# Patient Record
Sex: MALE | Race: WHITE | Employment: FULL TIME | URBAN - METROPOLITAN AREA
[De-identification: names, ages, dates, MRNs, and addresses within clinical notes are randomized per-mention and may not be internally consistent; named-entity substitution may affect disease eponyms.]

---

## 2017-12-17 ENCOUNTER — APPOINTMENT (OUTPATIENT)
Dept: CT IMAGING | Facility: HOSPITAL | Age: 47
End: 2017-12-17
Attending: EMERGENCY MEDICINE
Payer: COMMERCIAL

## 2017-12-17 ENCOUNTER — HOSPITAL ENCOUNTER (EMERGENCY)
Facility: HOSPITAL | Age: 47
Discharge: HOME | End: 2017-12-17
Attending: EMERGENCY MEDICINE
Payer: COMMERCIAL

## 2017-12-17 ENCOUNTER — HOSPITAL ENCOUNTER (OUTPATIENT)
Age: 47
Discharge: ANOTHER HOSPITAL | End: 2017-12-17
Payer: COMMERCIAL

## 2017-12-17 VITALS
DIASTOLIC BLOOD PRESSURE: 100 MMHG | SYSTOLIC BLOOD PRESSURE: 130 MMHG | RESPIRATION RATE: 16 BRPM | WEIGHT: 230 LBS | HEART RATE: 79 BPM | OXYGEN SATURATION: 98 % | TEMPERATURE: 98 F

## 2017-12-17 VITALS
BODY MASS INDEX: 31.15 KG/M2 | HEIGHT: 72 IN | DIASTOLIC BLOOD PRESSURE: 89 MMHG | RESPIRATION RATE: 16 BRPM | OXYGEN SATURATION: 95 % | SYSTOLIC BLOOD PRESSURE: 135 MMHG | TEMPERATURE: 97 F | HEART RATE: 72 BPM | WEIGHT: 230 LBS

## 2017-12-17 DIAGNOSIS — R31.9 HEMATURIA, UNSPECIFIED TYPE: ICD-10-CM

## 2017-12-17 DIAGNOSIS — N20.1 RIGHT URETERAL STONE: ICD-10-CM

## 2017-12-17 DIAGNOSIS — R10.9 FLANK PAIN: Primary | ICD-10-CM

## 2017-12-17 DIAGNOSIS — N23 RENAL COLIC: Primary | ICD-10-CM

## 2017-12-17 PROCEDURE — 96374 THER/PROPH/DIAG INJ IV PUSH: CPT

## 2017-12-17 PROCEDURE — 81001 URINALYSIS AUTO W/SCOPE: CPT | Performed by: EMERGENCY MEDICINE

## 2017-12-17 PROCEDURE — 99202 OFFICE O/P NEW SF 15 MIN: CPT

## 2017-12-17 PROCEDURE — 80048 BASIC METABOLIC PNL TOTAL CA: CPT | Performed by: EMERGENCY MEDICINE

## 2017-12-17 PROCEDURE — 96361 HYDRATE IV INFUSION ADD-ON: CPT

## 2017-12-17 PROCEDURE — 74176 CT ABD & PELVIS W/O CONTRAST: CPT | Performed by: EMERGENCY MEDICINE

## 2017-12-17 PROCEDURE — 99203 OFFICE O/P NEW LOW 30 MIN: CPT

## 2017-12-17 PROCEDURE — 99284 EMERGENCY DEPT VISIT MOD MDM: CPT

## 2017-12-17 PROCEDURE — 85025 COMPLETE CBC W/AUTO DIFF WBC: CPT | Performed by: EMERGENCY MEDICINE

## 2017-12-17 PROCEDURE — 81002 URINALYSIS NONAUTO W/O SCOPE: CPT

## 2017-12-17 RX ORDER — HYDROCODONE BITARTRATE AND ACETAMINOPHEN 5; 325 MG/1; MG/1
1-2 TABLET ORAL EVERY 4 HOURS PRN
Qty: 12 TABLET | Refills: 0 | Status: SHIPPED | OUTPATIENT
Start: 2017-12-17 | End: 2017-12-20

## 2017-12-17 RX ORDER — KETOROLAC TROMETHAMINE 30 MG/ML
30 INJECTION, SOLUTION INTRAMUSCULAR; INTRAVENOUS ONCE
Status: COMPLETED | OUTPATIENT
Start: 2017-12-17 | End: 2017-12-17

## 2017-12-17 RX ORDER — TAMSULOSIN HYDROCHLORIDE 0.4 MG/1
0.4 CAPSULE ORAL DAILY
Qty: 7 CAPSULE | Refills: 0 | Status: SHIPPED | OUTPATIENT
Start: 2017-12-17 | End: 2017-12-24

## 2017-12-17 NOTE — ED NOTES
PT safe to DC home per MD. Marilou Richards to dress self. DC teaching done, pt verbalizes understanding. Ambulatory with steady gait to exit.

## 2017-12-17 NOTE — ED INITIAL ASSESSMENT (HPI)
Right flank pain since yesterday. Sent from [address] [place] [address] to r/o kidney stone.

## 2017-12-17 NOTE — ED INITIAL ASSESSMENT (HPI)
C/o urinary frequency. Also c/o RT lumbar pain radiating to hir RT quadrant area.  DEneis burning or pain in urination started last night

## 2017-12-17 NOTE — ED PROVIDER NOTES
Patient presents with:  Abdomen/Flank Pain (GI/)  Urinary Symptoms (urologic)      HPI:     Yajaira Shah is a 52year old male who presents for evaluation of a chief complaint of R sided flank pain that radiates into RLQ.  Pt reports symptoms initial and RLQ tenderness on examination. . Pt appears uncomfortable, will send to the ER for higher level of care.        Orders Placed This Encounter      POC Urinalysis Dipstick Once    Labs performed this visit:    Recent Results (from the past 10 hour(s))  -E

## 2017-12-17 NOTE — ED PROVIDER NOTES
Patient Seen in: [facility] Emergency Department    History   Patient presents with:  Abdomen/Flank Pain (GI/)    Stated Complaint: flank pain    HPI    Patient is a 51-year-old male who states that yesterday he had some very mild right-sided fl normal and breath sounds normal. No respiratory distress. Abdominal: Soft. Bowel sounds are normal. Exhibits no distension and no mass. There is no tenderness. There is no rebound and no guarding. Musculoskeletal: Normal range of motion.  Exhibits no ed Oral)(cpt=74176)    Result Date: 12/17/2017  CONCLUSION: 4 mm calculus distal right ureter with moderate right hydroureteronephrosis. Chronic left UPJ obstruction. Splenomegaly of unknown etiology. Findings discussed with patient.   He would like

## 2017-12-20 ENCOUNTER — TELEPHONE (OUTPATIENT)
Dept: SURGERY | Facility: CLINIC | Age: 47
End: 2017-12-20

## 2017-12-26 NOTE — TELEPHONE ENCOUNTER
Called and offered patient ER follow up appointment for right kidney stone with [facility] for 12/28/17 at 11:20. Patient accepted appointment. Informed patient to arrive 15 minutes prior to fill out paperwork.

## 2017-12-28 ENCOUNTER — APPOINTMENT (OUTPATIENT)
Dept: LAB | Facility: HOSPITAL | Age: 47
End: 2017-12-28
Attending: UROLOGY
Payer: COMMERCIAL

## 2017-12-28 ENCOUNTER — OFFICE VISIT (OUTPATIENT)
Dept: SURGERY | Facility: CLINIC | Age: 47
End: 2017-12-28

## 2017-12-28 VITALS
TEMPERATURE: 98 F | DIASTOLIC BLOOD PRESSURE: 79 MMHG | SYSTOLIC BLOOD PRESSURE: 137 MMHG | RESPIRATION RATE: 16 BRPM | HEART RATE: 62 BPM | HEIGHT: 72 IN | BODY MASS INDEX: 31.15 KG/M2 | WEIGHT: 230 LBS

## 2017-12-28 DIAGNOSIS — N20.1 URETERAL CALCULI: ICD-10-CM

## 2017-12-28 DIAGNOSIS — N20.0 KIDNEY STONES: ICD-10-CM

## 2017-12-28 DIAGNOSIS — Z12.5 SCREENING PSA (PROSTATE SPECIFIC ANTIGEN): ICD-10-CM

## 2017-12-28 DIAGNOSIS — N20.0 KIDNEY STONES: Primary | ICD-10-CM

## 2017-12-28 LAB
BILIRUB UR QL: NEGATIVE
CLARITY UR: CLEAR
COLOR UR: YELLOW
GLUCOSE UR-MCNC: NEGATIVE MG/DL
HGB UR QL STRIP.AUTO: NEGATIVE
KETONES UR-MCNC: NEGATIVE MG/DL
LEUKOCYTE ESTERASE UR QL STRIP.AUTO: NEGATIVE
NITRITE UR QL STRIP.AUTO: NEGATIVE
PH UR: 5 [PH] (ref 5–8)
PROT UR-MCNC: NEGATIVE MG/DL
PSA SERPL-MCNC: 0.2 NG/ML (ref 0–4)
SP GR UR STRIP: 1.02 (ref 1–1.03)
URATE SERPL-MCNC: 6.3 MG/DL (ref 3.3–8.7)
UROBILINOGEN UR STRIP-ACNC: <2
VIT C UR-MCNC: NEGATIVE MG/DL

## 2017-12-28 PROCEDURE — 81003 URINALYSIS AUTO W/O SCOPE: CPT

## 2017-12-28 PROCEDURE — 36415 COLL VENOUS BLD VENIPUNCTURE: CPT

## 2017-12-28 PROCEDURE — 84550 ASSAY OF BLOOD/URIC ACID: CPT

## 2017-12-28 PROCEDURE — 99204 OFFICE O/P NEW MOD 45 MIN: CPT | Performed by: UROLOGY

## 2017-12-28 PROCEDURE — 99212 OFFICE O/P EST SF 10 MIN: CPT | Performed by: UROLOGY

## 2017-12-28 NOTE — PROGRESS NOTES
[facility] Patient Status:  Outpatient    10/16/1970 MRN [id number]   Location [address] [place] [address] Attending Derik De La Garza. [address] Day # 0 PCP No primary care provider on file.        Berhane Lima for kidney or bladder stones prior to above. No kidney infections with high fever or flank pain. No history of bladder, prostate, testicular, epididymal infections. No history of trauma or injury to the urinary tract or genitalia.  No history of tumors pain.  8.   There is no history of skin disease. 9.   No history of neurological disease in the form of stroke, multiple sclerosis, seizures or depression. 10. No history of endocrine disorders in the form of diabetes or thyroid disease. 11.  No histor Glans and meatus are normal.  Penile shaft skin is normal.  There are no masses or plaques. Scrotum is fully developed. Testicles are descended bilaterally and normal in size, position and consistency. Epididymis, vas and cord structures are intact.   Valentine Segovia face-to-face discussion of further evaluation and therapy. Eric Moreau MD    .........................................12/28/2017

## 2018-01-08 ENCOUNTER — HOSPITAL ENCOUNTER (OUTPATIENT)
Dept: GENERAL RADIOLOGY | Facility: HOSPITAL | Age: 48
Discharge: HOME | End: 2018-01-08
Attending: UROLOGY
Payer: COMMERCIAL

## 2018-01-08 DIAGNOSIS — N20.1 URETERAL CALCULI: ICD-10-CM

## 2018-01-08 LAB — CREAT BLD-MCNC: 0.9 MG/DL (ref 0.5–1.5)

## 2018-01-08 PROCEDURE — 82565 ASSAY OF CREATININE: CPT

## 2018-01-08 PROCEDURE — 74415 UROGRAPHY NFS DRIP&/BLS W/NF: CPT | Performed by: UROLOGY

## 2018-01-30 ENCOUNTER — OFFICE VISIT (OUTPATIENT)
Dept: SURGERY | Facility: CLINIC | Age: 48
End: 2018-01-30

## 2018-01-30 VITALS
BODY MASS INDEX: 31.15 KG/M2 | TEMPERATURE: 98 F | RESPIRATION RATE: 18 BRPM | WEIGHT: 230 LBS | HEART RATE: 74 BPM | HEIGHT: 72 IN | DIASTOLIC BLOOD PRESSURE: 92 MMHG | SYSTOLIC BLOOD PRESSURE: 143 MMHG

## 2018-01-30 DIAGNOSIS — N20.1 URETERAL CALCULI: Primary | ICD-10-CM

## 2018-01-30 PROCEDURE — 99212 OFFICE O/P EST SF 10 MIN: CPT | Performed by: UROLOGY

## 2018-01-30 PROCEDURE — 99214 OFFICE O/P EST MOD 30 MIN: CPT | Performed by: UROLOGY

## 2018-01-30 NOTE — PROGRESS NOTES
[facility] Patient Status:  Outpatient    10/16/1970 MRN [id number]   Location [address] [place] [address] Attending Jenelle Clemons. [address] Day # 0 PCP No primary care provider on file.        Jasmina Kelly patient's first onset of renal colic.   Patient denied previous history kidney stones but does have a family history kidney stones in his father patient awoke on December 17, 2017 right lower quadrant pain rating to the right flank no nausea vomiting howeve

## 2018-02-28 ENCOUNTER — OFFICE VISIT (OUTPATIENT)
Dept: SURGERY | Facility: CLINIC | Age: 48
End: 2018-02-28

## 2018-02-28 VITALS
HEART RATE: 67 BPM | WEIGHT: 230 LBS | DIASTOLIC BLOOD PRESSURE: 90 MMHG | BODY MASS INDEX: 31.15 KG/M2 | HEIGHT: 72 IN | TEMPERATURE: 98 F | SYSTOLIC BLOOD PRESSURE: 133 MMHG

## 2018-02-28 DIAGNOSIS — Z30.09 VASECTOMY EVALUATION: Primary | ICD-10-CM

## 2018-02-28 PROCEDURE — 99214 OFFICE O/P EST MOD 30 MIN: CPT | Performed by: UROLOGY

## 2018-02-28 PROCEDURE — 99212 OFFICE O/P EST SF 10 MIN: CPT | Performed by: UROLOGY

## 2018-02-28 RX ORDER — LOSARTAN POTASSIUM 25 MG/1
TABLET ORAL
COMMUNITY
Start: 2018-02-07

## 2018-02-28 RX ORDER — DIAZEPAM 10 MG/1
TABLET ORAL
Qty: 1 TABLET | Refills: 0 | Status: SHIPPED | OUTPATIENT
Start: 2018-02-28

## 2018-02-28 NOTE — PROGRESS NOTES
[facility] Patient Status:  Outpatient    10/16/1970 MRN [id number]   Location [facility] Attending Bri Elizondo. [address] Day # 0 PCP No primary care provider on file.        Pawel Monroy 2017 and initial consultation patient did sit for normal urinalysis and a very low normal PSA at that visit as well at 0.2        ASSESSMENT AND PLAN:  Case summary: Patient is a 15-year-old white male referred initially through the emergency room December ten days prior to procedure, he can then take Tylenol. We do discuss the preparation of eating a well-balanced meal the day of the procedure, exactly how the scrotum is shaved and prepped.   We do discuss the incisions themselves, position and what is take Prescription for Valium of 10 mg to be taken an hour before the procedure given to patient. I answered all of the patient's questions.   I spent 35 minutes with patient and well over half of the time in face to face discussion of further evaluation and th

## 2018-05-04 ENCOUNTER — TELEPHONE (OUTPATIENT)
Dept: SURGERY | Facility: CLINIC | Age: 48
End: 2018-05-04

## 2018-05-04 NOTE — TELEPHONE ENCOUNTER
Pt called stating pt has to cxl vas procedure 5-18-18. Pt will not be available for 2 to 2 and a half months. Pt will call back to rs.

## 2018-05-11 NOTE — TELEPHONE ENCOUNTER
Spoke with pt and he confirmed that he does want to cxl his 5/18 Vas and he will c/b when he is back in town to reschd.

## 2018-06-27 ENCOUNTER — APPOINTMENT (OUTPATIENT)
Dept: OCCUPATIONAL MEDICINE | Age: 48
End: 2018-06-27
Attending: EMERGENCY MEDICINE

## 2019-09-30 ENCOUNTER — APPOINTMENT (OUTPATIENT)
Dept: CT IMAGING | Facility: HOSPITAL | Age: 49
End: 2019-09-30
Attending: EMERGENCY MEDICINE
Payer: COMMERCIAL

## 2019-09-30 ENCOUNTER — HOSPITAL ENCOUNTER (EMERGENCY)
Facility: HOSPITAL | Age: 49
Discharge: HOME | End: 2019-09-30
Attending: EMERGENCY MEDICINE
Payer: COMMERCIAL

## 2019-09-30 VITALS
DIASTOLIC BLOOD PRESSURE: 70 MMHG | RESPIRATION RATE: 18 BRPM | OXYGEN SATURATION: 98 % | HEART RATE: 74 BPM | TEMPERATURE: 98 F | SYSTOLIC BLOOD PRESSURE: 120 MMHG

## 2019-09-30 DIAGNOSIS — N20.1 RIGHT URETERAL STONE: Primary | ICD-10-CM

## 2019-09-30 PROCEDURE — 99284 EMERGENCY DEPT VISIT MOD MDM: CPT

## 2019-09-30 PROCEDURE — 96361 HYDRATE IV INFUSION ADD-ON: CPT

## 2019-09-30 PROCEDURE — 80048 BASIC METABOLIC PNL TOTAL CA: CPT | Performed by: EMERGENCY MEDICINE

## 2019-09-30 PROCEDURE — 74176 CT ABD & PELVIS W/O CONTRAST: CPT | Performed by: EMERGENCY MEDICINE

## 2019-09-30 PROCEDURE — 96374 THER/PROPH/DIAG INJ IV PUSH: CPT

## 2019-09-30 PROCEDURE — 85025 COMPLETE CBC W/AUTO DIFF WBC: CPT | Performed by: EMERGENCY MEDICINE

## 2019-09-30 PROCEDURE — 81001 URINALYSIS AUTO W/SCOPE: CPT | Performed by: EMERGENCY MEDICINE

## 2019-09-30 RX ORDER — KETOROLAC TROMETHAMINE 30 MG/ML
30 INJECTION, SOLUTION INTRAMUSCULAR; INTRAVENOUS ONCE
Status: COMPLETED | OUTPATIENT
Start: 2019-09-30 | End: 2019-09-30

## 2019-09-30 RX ORDER — ONDANSETRON 4 MG/1
4 TABLET, ORALLY DISINTEGRATING ORAL EVERY 8 HOURS PRN
Qty: 20 TABLET | Refills: 0 | Status: SHIPPED | OUTPATIENT
Start: 2019-09-30

## 2019-10-01 NOTE — ED PROVIDER NOTES
Patient Seen in: [facility] Emergency Department    History   Patient presents with:  Abdomen/Flank Pain (GI/)    Stated Complaint: thinks he has kidney stone    HPI    Patient is here with complaint of possible kidney stone.   He has had one on Vital signs noted. Eye:  No scleral icterus. Eyelids appear normal, no lesions. Cardiovascular:  Normal S1 and S2, no murmur, regular, with good peripheral perfusion. Respiratory:  Lungs clear to auscultation bilaterally with good effort.   No wheezes, ---------                               -----------         ------                     CBC W/ DIFFERENTIAL[[id number]]          Abnormal            Final result                 Please view results for these tests on the individual orders.    Jordon Lozano

## 2019-10-28 PROCEDURE — 99283 EMERGENCY DEPT VISIT LOW MDM: CPT | Performed by: EMERGENCY MEDICINE

## 2021-10-18 ENCOUNTER — APPOINTMENT (OUTPATIENT)
Dept: URBAN - METROPOLITAN AREA CLINIC 321 | Age: 51
Setting detail: DERMATOLOGY
End: 2021-10-18

## 2021-10-18 DIAGNOSIS — L30.0 NUMMULAR DERMATITIS: ICD-10-CM

## 2021-10-18 DIAGNOSIS — L81.4 OTHER MELANIN HYPERPIGMENTATION: ICD-10-CM

## 2021-10-18 DIAGNOSIS — D22 MELANOCYTIC NEVI: ICD-10-CM

## 2021-10-18 PROBLEM — D22.61 MELANOCYTIC NEVI OF RIGHT UPPER LIMB, INCLUDING SHOULDER: Status: ACTIVE | Noted: 2021-10-18

## 2021-10-18 PROBLEM — D22.5 MELANOCYTIC NEVI OF TRUNK: Status: ACTIVE | Noted: 2021-10-18

## 2021-10-18 PROBLEM — D22.62 MELANOCYTIC NEVI OF LEFT UPPER LIMB, INCLUDING SHOULDER: Status: ACTIVE | Noted: 2021-10-18

## 2021-10-18 PROCEDURE — OTHER PRESCRIPTION: OTHER

## 2021-10-18 PROCEDURE — 99214 OFFICE O/P EST MOD 30 MIN: CPT

## 2021-10-18 PROCEDURE — OTHER COUNSELING: OTHER

## 2021-10-18 RX ORDER — FLUOCINONIDE 0.5 MG/G
OINTMENT TOPICAL
Qty: 60 | Refills: 1 | Status: ERX | COMMUNITY
Start: 2021-10-18

## 2021-10-18 ASSESSMENT — LOCATION SIMPLE DESCRIPTION DERM
LOCATION SIMPLE: LEFT HAND
LOCATION SIMPLE: LEFT UPPER ARM
LOCATION SIMPLE: RIGHT HAND
LOCATION SIMPLE: RIGHT UPPER ARM
LOCATION SIMPLE: CHEST

## 2021-10-18 ASSESSMENT — LOCATION DETAILED DESCRIPTION DERM
LOCATION DETAILED: LEFT ULNAR DORSAL HAND
LOCATION DETAILED: UPPER STERNUM
LOCATION DETAILED: RIGHT ANTERIOR DISTAL UPPER ARM
LOCATION DETAILED: RIGHT DORSAL MIDDLE METACARPOPHALANGEAL JOINT
LOCATION DETAILED: MIDDLE STERNUM
LOCATION DETAILED: LEFT ANTECUBITAL SKIN
LOCATION DETAILED: LEFT ANTERIOR DISTAL UPPER ARM

## 2021-10-18 ASSESSMENT — LOCATION ZONE DERM
LOCATION ZONE: TRUNK
LOCATION ZONE: ARM
LOCATION ZONE: HAND

## 2021-12-02 ENCOUNTER — TELEPHONE (OUTPATIENT)
Dept: CASE MANAGEMENT | Age: 51
End: 2021-12-02

## 2021-12-02 ENCOUNTER — HOSPITAL ENCOUNTER (OUTPATIENT)
Age: 51
Discharge: HOME | End: 2021-12-02
Payer: COMMERCIAL

## 2021-12-02 VITALS
RESPIRATION RATE: 18 BRPM | HEIGHT: 72 IN | SYSTOLIC BLOOD PRESSURE: 145 MMHG | OXYGEN SATURATION: 99 % | TEMPERATURE: 98 F | HEART RATE: 100 BPM | DIASTOLIC BLOOD PRESSURE: 87 MMHG | WEIGHT: 248 LBS | BODY MASS INDEX: 33.59 KG/M2

## 2021-12-02 DIAGNOSIS — Z20.822 ENCOUNTER FOR LABORATORY TESTING FOR COVID-19 VIRUS: ICD-10-CM

## 2021-12-02 DIAGNOSIS — J02.0 ACUTE STREPTOCOCCAL PHARYNGITIS: ICD-10-CM

## 2021-12-02 DIAGNOSIS — U07.1 COVID-19 VIRUS INFECTION: Primary | ICD-10-CM

## 2021-12-02 DIAGNOSIS — R03.0 ELEVATED BLOOD PRESSURE READING: ICD-10-CM

## 2021-12-02 DIAGNOSIS — H92.01 EARACHE ON RIGHT: ICD-10-CM

## 2021-12-02 PROCEDURE — U0002 COVID-19 LAB TEST NON-CDC: HCPCS | Performed by: PHYSICIAN ASSISTANT

## 2021-12-02 PROCEDURE — 87880 STREP A ASSAY W/OPTIC: CPT | Performed by: PHYSICIAN ASSISTANT

## 2021-12-02 PROCEDURE — 99204 OFFICE O/P NEW MOD 45 MIN: CPT | Performed by: PHYSICIAN ASSISTANT

## 2021-12-02 RX ORDER — PENICILLIN V POTASSIUM 500 MG/1
500 TABLET ORAL 2 TIMES DAILY
Qty: 20 TABLET | Refills: 0 | Status: SHIPPED | OUTPATIENT
Start: 2021-12-02 | End: 2021-12-12

## 2021-12-02 RX ORDER — ACETAMINOPHEN 325 MG/1
650 TABLET ORAL
Qty: 40 TABLET | Refills: 0 | Status: SHIPPED | OUTPATIENT
Start: 2021-12-02

## 2021-12-02 NOTE — TELEPHONE ENCOUNTER
Called patient to schedule PAB infusion  Patient states he is going back to the immediate care to have infusion done.

## 2021-12-02 NOTE — ED INITIAL ASSESSMENT (HPI)
Pt began experiencing right ear pain, chills, fatigue, and body aches yesterday and a sore throat this morning.

## 2021-12-02 NOTE — ED PROVIDER NOTES
Patient Seen in: [facility]    History   Patient presents with:  Ear Pain: Right ear  Covid-19 Test    Stated Complaint: flu?? ear inf??    HPI    49-year-old male presents with chief complaint of right earache. Onset yesterday.   Patient rep stated complaint: flu?? ear inf??  Other systems are as noted in HPI. Constitutional and vital signs reviewed. All other systems reviewed and negative except as noted above.     PSFH elements reviewed from today and agreed except as otherwise stated i obvious deformity. Neurological: Gross motor movement is intact in all 4 extremities. Patient exhibits normal speech. Skin: Skin is normal to inspection. Warm and dry. No obvious bruising. No obvious rash.       ED Course     Labs Reviewed   POCT ANALY items, clean and disinfect “high touch” surfaces, and frequent handwashing) according to CDC guidelines. The patient was informed of their elevated blood pressure reading at immediate care.   They were informed of the dangers of undiagnosed and untre

## 2021-12-05 ENCOUNTER — NURSE ONLY (OUTPATIENT)
Dept: INFUSION CENTER | Age: 51
End: 2021-12-05
Attending: PHYSICIAN ASSISTANT
Payer: COMMERCIAL

## 2021-12-05 VITALS
BODY MASS INDEX: 33.18 KG/M2 | SYSTOLIC BLOOD PRESSURE: 133 MMHG | RESPIRATION RATE: 16 BRPM | DIASTOLIC BLOOD PRESSURE: 91 MMHG | HEIGHT: 72 IN | OXYGEN SATURATION: 100 % | TEMPERATURE: 98 F | WEIGHT: 245 LBS | HEART RATE: 85 BPM

## 2021-12-05 RX ORDER — ACETAMINOPHEN 500 MG
1000 TABLET ORAL ONCE AS NEEDED
Status: ACTIVE | OUTPATIENT
Start: 2021-12-05 | End: 2021-12-05

## 2021-12-05 NOTE — PROGRESS NOTES
2743- infusion started , iv patent wnl, pt aware of plan of care . 0916-infusion complete , iv flushed per protocol, pt stable no distress noted. Pt breathing easy, denies any new complaints. 0930- pt stable breathing easy , no distress noted.

## 2021-12-17 ENCOUNTER — TELEPHONE (OUTPATIENT)
Dept: CASE MANAGEMENT | Age: 51
End: 2021-12-17

## 2021-12-17 NOTE — TELEPHONE ENCOUNTER
Called patient to follow up for home monitoring  Left message to call back.    COVID 19 positive: 12/2

## 2025-08-11 ENCOUNTER — APPOINTMENT (OUTPATIENT)
Dept: URBAN - METROPOLITAN AREA CLINIC 244 | Age: 55
Setting detail: DERMATOLOGY
End: 2025-08-12

## 2025-08-11 DIAGNOSIS — D22 MELANOCYTIC NEVI: ICD-10-CM

## 2025-08-11 DIAGNOSIS — Z80.8 FAMILY HISTORY OF MALIGNANT NEOPLASM OF OTHER ORGANS OR SYSTEMS: ICD-10-CM

## 2025-08-11 DIAGNOSIS — L81.4 OTHER MELANIN HYPERPIGMENTATION: ICD-10-CM

## 2025-08-11 DIAGNOSIS — Z12.83 ENCOUNTER FOR SCREENING FOR MALIGNANT NEOPLASM OF SKIN: ICD-10-CM

## 2025-08-11 PROBLEM — D22.61 MELANOCYTIC NEVI OF RIGHT UPPER LIMB, INCLUDING SHOULDER: Status: ACTIVE | Noted: 2025-08-11

## 2025-08-11 PROBLEM — D22.5 MELANOCYTIC NEVI OF TRUNK: Status: ACTIVE | Noted: 2025-08-11

## 2025-08-11 PROCEDURE — 99203 OFFICE O/P NEW LOW 30 MIN: CPT

## 2025-08-11 PROCEDURE — OTHER COUNSELING: OTHER

## 2025-08-11 PROCEDURE — OTHER DIAGNOSIS COMMENT: OTHER

## 2025-08-11 PROCEDURE — OTHER MIPS QUALITY: OTHER

## 2025-08-11 PROCEDURE — OTHER SUNSCREEN RECOMMENDATIONS: OTHER

## 2025-08-11 ASSESSMENT — LOCATION DETAILED DESCRIPTION DERM
LOCATION DETAILED: RIGHT SUPERIOR UPPER BACK
LOCATION DETAILED: LEFT MID-UPPER BACK
LOCATION DETAILED: LEFT INFERIOR MEDIAL MIDBACK
LOCATION DETAILED: RIGHT POSTERIOR SHOULDER

## 2025-08-11 ASSESSMENT — LOCATION SIMPLE DESCRIPTION DERM
LOCATION SIMPLE: LEFT LOWER BACK
LOCATION SIMPLE: RIGHT UPPER BACK
LOCATION SIMPLE: LEFT UPPER BACK
LOCATION SIMPLE: RIGHT SHOULDER

## 2025-08-11 ASSESSMENT — LOCATION ZONE DERM
LOCATION ZONE: TRUNK
LOCATION ZONE: ARM

## (undated) NOTE — LETTER
Date & Time: 12/2/2021, 1:57 PM  Patient: Jeremias Fly  Encounter Provider(s):    SÁNCHEZ Rogers       To Whom It May Concern:    Mayra Norton was seen and treated in our department on 12/2/2021. He may return to work on 12/10/2021.     If yo

## (undated) NOTE — ED AVS SNAPSHOT
Nithin Ramos   MRN: [id number]    Department:  [facility] Emergency Department   Date of Visit:  12/17/2017           Disclosure     Insurance plans vary and the physician(s) referred by the ER may not be covered by your plan.  Please conta CARE PHYSICIAN AT ONCE OR RETURN IMMEDIATELY TO THE EMERGENCY DEPARTMENT. If you have been prescribed any medication(s), please fill your prescription right away and begin taking the medication(s) as directed.   If you believe that any of the medications

## (undated) NOTE — ED AVS SNAPSHOT
Adali Victor   MRN: [id number]    Department:  [facility] Emergency Department   Date of Visit:  9/30/2019           Disclosure     Insurance plans vary and the physician(s) referred by the ER may not be covered by your plan.  Please contac CARE PHYSICIAN AT ONCE OR RETURN IMMEDIATELY TO THE EMERGENCY DEPARTMENT. If you have been prescribed any medication(s), please fill your prescription right away and begin taking the medication(s) as directed.   If you believe that any of the medications